# Patient Record
Sex: FEMALE | Race: BLACK OR AFRICAN AMERICAN | NOT HISPANIC OR LATINO | Employment: FULL TIME | URBAN - METROPOLITAN AREA
[De-identification: names, ages, dates, MRNs, and addresses within clinical notes are randomized per-mention and may not be internally consistent; named-entity substitution may affect disease eponyms.]

---

## 2023-03-31 LAB
CLUE CELLS: NORMAL
HEPATITIS B VIRUS SURFACE AG PRESENCE IN SERUM: NONREACTIVE
HEPATITIS C VIRUS AB PRESENCE IN SERUM: NONREACTIVE
HIV 1/ 2 AG/AB SCREEN: NONREACTIVE
NUGENT SCORE: 1
SYPHILIS TOTAL AB: NONREACTIVE
YEAST: NORMAL

## 2023-04-01 LAB
CHLAMYDIA TRACH., AMPLIFIED: NEGATIVE
N. GONORRHEA, AMPLIFIED: NEGATIVE
TRICHOMONAS VAGINALIS: NEGATIVE

## 2023-04-02 LAB — URINE CULTURE: ABNORMAL

## 2023-05-15 ENCOUNTER — APPOINTMENT (OUTPATIENT)
Dept: LAB | Facility: LAB | Age: 33
End: 2023-05-15

## 2023-05-16 LAB — URINE CULTURE: NORMAL

## 2023-09-21 ENCOUNTER — APPOINTMENT (OUTPATIENT)
Dept: PRIMARY CARE | Facility: CLINIC | Age: 33
End: 2023-09-21
Payer: MEDICAID

## 2023-09-21 DIAGNOSIS — A60.00 GENITAL HERPES SIMPLEX, UNSPECIFIED SITE: Primary | ICD-10-CM

## 2023-09-21 RX ORDER — VALACYCLOVIR HYDROCHLORIDE 500 MG/1
500 TABLET, FILM COATED ORAL 2 TIMES DAILY
Qty: 6 TABLET | Refills: 0 | Status: SHIPPED | OUTPATIENT
Start: 2023-09-21 | End: 2023-09-24

## 2024-01-19 ENCOUNTER — APPOINTMENT (OUTPATIENT)
Dept: PRIMARY CARE | Facility: CLINIC | Age: 34
End: 2024-01-19
Payer: MEDICAID

## 2024-01-19 ENCOUNTER — E-VISIT (OUTPATIENT)
Dept: PRIMARY CARE | Facility: CLINIC | Age: 34
End: 2024-01-19
Payer: MEDICAID

## 2024-01-19 DIAGNOSIS — A60.04 HERPES SIMPLEX VULVOVAGINITIS: Primary | ICD-10-CM

## 2024-01-19 RX ORDER — VALACYCLOVIR HYDROCHLORIDE 500 MG/1
500 TABLET, FILM COATED ORAL 2 TIMES DAILY
Qty: 6 TABLET | Refills: 0 | Status: SHIPPED | OUTPATIENT
Start: 2024-01-19 | End: 2024-01-22

## 2024-01-19 NOTE — TELEPHONE ENCOUNTER
[facility] Visit Note      E-Visit  Chief Complaint   Patient presents with    Rash     Entered automatically based on patient selection in  imoji.       With patient's permission, this is an E-visit to provide on demand virtual care through [facility] in the acute care setting.     HPI:  Melanie Brewster is a 33 y.o. female contacting  [facility] for concerns regarding a skin lesion    Current PCP: Aby Blanco    Patient's questionnaire answers reviewed: Rash and skin condition.  Symptoms started yesterday with a genital lesion that is white and unchanged over time.  A reoccurring skin condition described as genital herpes.  2 photos were attached but both are black and without any images.    Per chart review patient is known to have HSV and recorded as previously being on Valtrex.    Past Medical History:   Diagnosis Date    Anemia complicating pregnancy, third trimester 2016    Anemia during pregnancy in third trimester    Encounter for supervision of other normal pregnancy, third trimester 2016    Encounter for supervision of other normal pregnancy in third trimester    Encounter for supervision of other normal pregnancy, third trimester 2016    Prenatal care, subsequent pregnancy in third trimester    Encounter for supervision of other normal pregnancy, unspecified trimester 2016    Normal pregnancy in multigravida, antepartum    Personal history of other complications of pregnancy, childbirth and the puerperium 2015    History of ectopic pregnancy    Personal history of other diseases of the respiratory system     History of asthma    Supervision of pregnancy with insufficient  care, unspecified trimester 2016    Late prenatal care        Current Medications  Current Outpatient Medications   Medication Instructions    valACYclovir (VALTREX) 500 mg, oral, 2 times daily        Allergies  No Known Allergies     Past  Surgical History:   Procedure Laterality Date    OTHER SURGICAL HISTORY  07/21/2016    Wrist Surgery     No family history on file.     Tobacco Use: Not on file        ROS  All questionnaire responses reviewed.    VITAL SIGNS  Patient is unable to provide    PHYSICAL EXAM  E-visit, no exam component done.      Problem List Items Addressed This Visit             ICD-10-CM    Genital herpes simplex - Primary A60.00    Relevant Medications    valACYclovir (Valtrex) 500 mg tablet       Additional Visit Plans:  Unfortunately your photos did not come through but given your symptoms and that you are recorded as having a history of HSV previously treated with Valtrex I will treat you for what is understood to be a herpes outbreak/episode.    Please take Valtrex 500 mg every 12 hours for the next 3 days.  You are contagious while you have an active skin lesion, so please avoid direct skin contact with others/sexual activity until the skin lesion is gone.    Follow up  If continuing to experience symptoms despite the treatments I have prescribed to you today, please follow up with a primary care provider as needed.   To connect with a new PCP please visit [email or web address] or call [phone]    If experiencing any severe or worsening symptoms including but not limited to lethargy / chest pain / weakness / dizziness / difficulty breathing please call 101 or go to the emergency department for immediate care!    Thank you for allowing me to participate in your care needs today! I hope you feel better soon.         Filemon Martin DO   01/19/24   8:52 AM   [facility] Provider

## 2024-01-30 ENCOUNTER — OFFICE VISIT (OUTPATIENT)
Dept: PRIMARY CARE | Facility: HOSPITAL | Age: 34
End: 2024-01-30
Payer: MEDICAID

## 2024-01-30 VITALS
BODY MASS INDEX: 35.24 KG/M2 | DIASTOLIC BLOOD PRESSURE: 78 MMHG | HEIGHT: 64 IN | SYSTOLIC BLOOD PRESSURE: 108 MMHG | TEMPERATURE: 97.1 F | WEIGHT: 206.4 LBS

## 2024-01-30 DIAGNOSIS — J45.909 MILD ASTHMA, UNSPECIFIED WHETHER COMPLICATED, UNSPECIFIED WHETHER PERSISTENT (HHS-HCC): ICD-10-CM

## 2024-01-30 DIAGNOSIS — Z00.00 HEALTH CARE MAINTENANCE: Primary | ICD-10-CM

## 2024-01-30 LAB
ALBUMIN SERPL BCP-MCNC: 3.8 G/DL (ref 3.4–5)
ALP SERPL-CCNC: 43 U/L (ref 33–110)
ALT SERPL W P-5'-P-CCNC: 19 U/L (ref 7–45)
ANION GAP SERPL CALC-SCNC: 10 MMOL/L (ref 10–20)
AST SERPL W P-5'-P-CCNC: 14 U/L (ref 9–39)
BASOPHILS # BLD AUTO: 0.02 X10*3/UL (ref 0–0.1)
BASOPHILS NFR BLD AUTO: 0.3 %
BILIRUB SERPL-MCNC: 0.3 MG/DL (ref 0–1.2)
BUN SERPL-MCNC: 12 MG/DL (ref 6–23)
CALCIUM SERPL-MCNC: 9 MG/DL (ref 8.6–10.6)
CHLORIDE SERPL-SCNC: 107 MMOL/L (ref 98–107)
CHOLEST SERPL-MCNC: 132 MG/DL (ref 0–199)
CHOLESTEROL/HDL RATIO: 3.4
CO2 SERPL-SCNC: 26 MMOL/L (ref 21–32)
CREAT SERPL-MCNC: 0.68 MG/DL (ref 0.5–1.05)
EGFRCR SERPLBLD CKD-EPI 2021: >90 ML/MIN/1.73M*2
EOSINOPHIL # BLD AUTO: 0.15 X10*3/UL (ref 0–0.7)
EOSINOPHIL NFR BLD AUTO: 2.2 %
ERYTHROCYTE [DISTWIDTH] IN BLOOD BY AUTOMATED COUNT: 13.5 % (ref 11.5–14.5)
EST. AVERAGE GLUCOSE BLD GHB EST-MCNC: 100 MG/DL
GLUCOSE SERPL-MCNC: 125 MG/DL (ref 74–99)
HBA1C MFR BLD: 5.1 %
HCT VFR BLD AUTO: 38.4 % (ref 36–46)
HDLC SERPL-MCNC: 38.3 MG/DL
HGB BLD-MCNC: 12.7 G/DL (ref 12–16)
IMM GRANULOCYTES # BLD AUTO: 0.06 X10*3/UL (ref 0–0.7)
IMM GRANULOCYTES NFR BLD AUTO: 0.9 % (ref 0–0.9)
LDLC SERPL CALC-MCNC: 79 MG/DL
LYMPHOCYTES # BLD AUTO: 1.78 X10*3/UL (ref 1.2–4.8)
LYMPHOCYTES NFR BLD AUTO: 26.4 %
MCH RBC QN AUTO: 31 PG (ref 26–34)
MCHC RBC AUTO-ENTMCNC: 33.1 G/DL (ref 32–36)
MCV RBC AUTO: 94 FL (ref 80–100)
MONOCYTES # BLD AUTO: 0.51 X10*3/UL (ref 0.1–1)
MONOCYTES NFR BLD AUTO: 7.6 %
NEUTROPHILS # BLD AUTO: 4.22 X10*3/UL (ref 1.2–7.7)
NEUTROPHILS NFR BLD AUTO: 62.6 %
NON HDL CHOLESTEROL: 94 MG/DL (ref 0–149)
NRBC BLD-RTO: 0 /100 WBCS (ref 0–0)
PLATELET # BLD AUTO: 282 X10*3/UL (ref 150–450)
POTASSIUM SERPL-SCNC: 4 MMOL/L (ref 3.5–5.3)
PROT SERPL-MCNC: 6.5 G/DL (ref 6.4–8.2)
RBC # BLD AUTO: 4.1 X10*6/UL (ref 4–5.2)
SODIUM SERPL-SCNC: 139 MMOL/L (ref 136–145)
TRIGL SERPL-MCNC: 74 MG/DL (ref 0–149)
TSH SERPL-ACNC: 3.29 MIU/L (ref 0.44–3.98)
VLDL: 15 MG/DL (ref 0–40)
WBC # BLD AUTO: 6.7 X10*3/UL (ref 4.4–11.3)

## 2024-01-30 PROCEDURE — 99213 OFFICE O/P EST LOW 20 MIN: CPT

## 2024-01-30 PROCEDURE — 36415 COLL VENOUS BLD VENIPUNCTURE: CPT

## 2024-01-30 PROCEDURE — 80061 LIPID PANEL: CPT

## 2024-01-30 PROCEDURE — 1036F TOBACCO NON-USER: CPT

## 2024-01-30 PROCEDURE — 83036 HEMOGLOBIN GLYCOSYLATED A1C: CPT

## 2024-01-30 PROCEDURE — 85025 COMPLETE CBC W/AUTO DIFF WBC: CPT

## 2024-01-30 PROCEDURE — 84443 ASSAY THYROID STIM HORMONE: CPT

## 2024-01-30 PROCEDURE — 99213 OFFICE O/P EST LOW 20 MIN: CPT | Mod: GC,GE,27

## 2024-01-30 PROCEDURE — 80053 COMPREHEN METABOLIC PANEL: CPT

## 2024-01-30 RX ORDER — ALBUTEROL SULFATE 90 UG/1
2 AEROSOL, METERED RESPIRATORY (INHALATION) EVERY 4 HOURS PRN
Qty: 8 G | Refills: 11 | Status: SHIPPED | OUTPATIENT
Start: 2024-01-30 | End: 2025-01-29

## 2024-01-30 SDOH — ECONOMIC STABILITY: FOOD INSECURITY: WITHIN THE PAST 12 MONTHS, YOU WORRIED THAT YOUR FOOD WOULD RUN OUT BEFORE YOU GOT MONEY TO BUY MORE.: NEVER TRUE

## 2024-01-30 SDOH — ECONOMIC STABILITY: FOOD INSECURITY: WITHIN THE PAST 12 MONTHS, THE FOOD YOU BOUGHT JUST DIDN'T LAST AND YOU DIDN'T HAVE MONEY TO GET MORE.: NEVER TRUE

## 2024-01-30 ASSESSMENT — LIFESTYLE VARIABLES
HOW MANY STANDARD DRINKS CONTAINING ALCOHOL DO YOU HAVE ON A TYPICAL DAY: PATIENT DOES NOT DRINK
HOW OFTEN DO YOU HAVE SIX OR MORE DRINKS ON ONE OCCASION: NEVER
SKIP TO QUESTIONS 9-10: 1
HOW OFTEN DO YOU HAVE A DRINK CONTAINING ALCOHOL: NEVER
AUDIT-C TOTAL SCORE: 0

## 2024-01-30 ASSESSMENT — PATIENT HEALTH QUESTIONNAIRE - PHQ9
1. LITTLE INTEREST OR PLEASURE IN DOING THINGS: NOT AT ALL
2. FEELING DOWN, DEPRESSED OR HOPELESS: NOT AT ALL
SUM OF ALL RESPONSES TO PHQ9 QUESTIONS 1 & 2: 0

## 2024-01-30 ASSESSMENT — ENCOUNTER SYMPTOMS
OCCASIONAL FEELINGS OF UNSTEADINESS: 0
LOSS OF SENSATION IN FEET: 0
DEPRESSION: 0

## 2024-01-30 ASSESSMENT — PAIN SCALES - GENERAL: PAINLEVEL: 0-NO PAIN

## 2024-01-30 NOTE — PROGRESS NOTES
I saw and evaluated the patient. I personally obtained the key and critical portions of the history and physical exam or was physically present for key and critical portions performed by the resident/fellow. I reviewed the resident/fellow's documentation and discussed the patient with the resident/fellow. I agree with the resident/fellow's medical decision making as documented in the note.    Case d/w Dr. Hendrix; agree with his A/P.  Importance of weight loss reviewed with pt. Continues Albuterol PRN. Pt encouraged to avoid beverages with caffeine. Would check blood work including TSH. Would attempt to obtain and review pt's last pap smear report.  Chandrika Matson MD

## 2024-01-30 NOTE — PROGRESS NOTES
"Subjective   Patient ID: Melanie Brewster is a 33 y.o. female who presents for Establish Care ([facility]) and Med Refill.    HPI   Pt is a 33 year old F with a pmhx of asthma and urinary incontinence who presented today to establish follow-up. She says she follows with an obs/gyn physician and urologist but last saw them about 1 year ago especially as she is no longer having urinary incontinence again. She is currently on her period.     As per her asthma, she uses her inhaler like two a week, last hospitalization for asthma was as a kid and last time she visited the ED for asthma was in 2016 and she was not admitted. She complains of occasional palpitations which is possibly correlating to her albuterol use.     Denies fever, chest pain, sob except on exertion, abd pain, skin changes, bilateral leg swelling. She denies etoh, tobacco or illicit drug use.     Review of Systems  12 point ROS is NC    Objective   /78 (BP Location: Left arm, Patient Position: Sitting, BP Cuff Size: Adult)   Temp 36.2 °C (97.1 °F) (Temporal)   Ht 1.626 m (5' 4\")   Wt 93.6 kg (206 lb 6.4 oz)   BMI 35.43 kg/m²     Physical Exam  GEN: no obvious distress  HEENT: no jvd  Chest: CTAB  CVS: s1s2 wnl  ABD: non-tender  MSK: no leg swelling  SKIN: no changes    Assessment/Plan   Pt is a 33 year old F with a pmhx of asthma and urinary incontinence who presented today to establish follow-up.     #Asthma: Mild intermittent  -counseled on trigger factors  -Albuterol refilled    #Obesity  ::BMI 35.43  -counseled on diet and exercise  -given referral to nutrition services  -Inquired about GLP-1 agonists: keep in view  -Hba1c ordered    #Urinary incontinence  #Uterine prolapse  -resolved    #Palpitations  -TSH ordered    #Health Maintenance  -received flu shot  -cbc/cmp/lipids/tsh/hba1c ordered  -not yet due for mammogram  -last pap smear/hpv testing was in 2021?? No records: encouraged to call gynecologist to inquire about when next " due.  -counseled on diet/exercise  -one-time screening for hep c  -can technically qualify for pneumococcal vaccine as asthma can qualify as immune-compromised?  -not sure if patient has received covid vaccine: will ask on next visit.     Ramakrishna Be PGY-3

## 2024-01-30 NOTE — PATIENT INSTRUCTIONS
Dear Ms Melanie,    Thank you for your visit today. We talked a couple of medical issues ranging from your med refill to weight loss referral to health maintenance screening which you are due for.  You will be given a referral to the weight loss clinic. You will also have labs done today to establish your baseline. Please, call your Obstetrics and Gynecology healthcare professional to ascertain your last pap smear and see when next you are due. We will see you in six months.    Vincent,  Ramakrishna Be MD

## 2024-06-01 ENCOUNTER — TELEMEDICINE (OUTPATIENT)
Dept: PRIMARY CARE | Facility: CLINIC | Age: 34
End: 2024-06-01
Payer: MEDICAID

## 2024-06-01 ENCOUNTER — APPOINTMENT (OUTPATIENT)
Dept: PRIMARY CARE | Facility: CLINIC | Age: 34
End: 2024-06-01
Payer: MEDICAID

## 2024-06-01 DIAGNOSIS — B00.9 HSV-2 (HERPES SIMPLEX VIRUS 2) INFECTION: Primary | ICD-10-CM

## 2024-06-01 PROCEDURE — 1036F TOBACCO NON-USER: CPT | Performed by: FAMILY MEDICINE

## 2024-06-01 PROCEDURE — 99212 OFFICE O/P EST SF 10 MIN: CPT | Performed by: FAMILY MEDICINE

## 2024-06-01 RX ORDER — VALACYCLOVIR HYDROCHLORIDE 500 MG/1
500 TABLET, FILM COATED ORAL 2 TIMES DAILY
Qty: 6 TABLET | Refills: 1 | Status: SHIPPED | OUTPATIENT
Start: 2024-06-01 | End: 2024-06-07

## 2024-06-01 RX ORDER — ASPIRIN 81 MG/1
81 TABLET ORAL
COMMUNITY
Start: 2024-05-23 | End: 2024-06-01 | Stop reason: ALTCHOICE

## 2024-06-01 RX ORDER — MEDROXYPROGESTERONE ACETATE 150 MG/ML
INJECTION, SUSPENSION INTRAMUSCULAR
COMMUNITY
Start: 2022-08-25 | End: 2024-06-01 | Stop reason: ALTCHOICE

## 2024-06-01 NOTE — PATIENT INSTRUCTIONS
HSV-2 outbreak  Valacyclovir as written-- 1 refill  Follow up as needed    Please send me a InHiro message if you have any questions or concerns.  FOR NON URGENT questions only.  Allow up to 72 hours for response.    If you have prescription issues or other questions you can email   Janeen Godwin  Digital Health Coordinator, at   [email or web address]     Rest and drink plenty of fluids    Tylenol and or motrin as needed for pain and fever (unless you have been told not to take these because of your personal medical history)    Discussed options and precautions (complaint specific and may include)  Viral versus bacterial infection; use of medications; possible side effects; appropriate over-the-counter medications; possible complications and /or when to follow-up.    Follow-up in 1 to 2 days if not improving.  Follow-up immediately if symptoms worsen.    All red flags requiring in person care were discussed.  All patient's questions were answered.    To connect with a new PCP please visit [email or web address] or call [phone]     If experiencing any severe or worsening symptoms including but not limited to lethargy / chest pain / weakness / dizziness / difficulty breathing please call 911 or go to the emergency department for immediate care!    Limitations to telemedicine include inability to do a complete and accurate physical exam.  Any concerns regarding this were conveyed with the patient and in person follow-up recommended if patient nature of illness does not progress as anticipated during this visit.    CDC updated Respiratory Infection guidelines:   When you have a respiratory virus, stay home and away from others (including people  you live with who are not sick) Symptoms can include fever, chills, fatigue, cough,  runny nose, and headache (and others).    You can go back to your normal activities when,   for at least 24 hours,   BOTH are true:    1) Your  symptoms are getting better overall  2) You have not had a fever (and are not using fever-reducing medication).    When you go back to your normal activities, take added precaution over the next 5 days, such as taking additional steps for  air, hygiene, masks, physical distancing, and/or testing when you will be around other people indoors.    Keep in mind that you may still be able to spread the virus that made you sick, even if you are feeling better. You are likely to be less contagious at this time, depending on factors like how long you were sick or how sick you were.    If you develop a fever or you start to feel worse after you have gone back to normal activities, stay home and away from others again until, for at least 24 hours, both are true: your symptoms are improving overall, and you have not had a fever (and are not using fever-reducing medication). Then take added precaution for the next 5 days.    If you never had symptoms but tested positive for a respiratory virus?, you may be contagious. For the next 5 days: take added precaution, such as taking additional steps for  air, hygiene, masks, physical distancing, and/or testing when you will be around other people indoors. This is especially important to protect people with factors that increase their risk of severe illness from respiratory viruses.  Avoid immunocompromised, elderly, pregnant women, infants etc    Have a low threshold for in person evaluation if your symptoms worsen.

## 2024-06-01 NOTE — PROGRESS NOTES
I performed this visit using realtime telehealth tools, including an audio/video OR telephone connection between the patient listed who was located in the STATE [facility] and myself, Aida Sparks (Board certified in the [facility]).  At the start of the visit, I introduced myself as Dr. Rhodes and verified the patients name, , and current physical location.    If they were currently outside of the state of [place], the visit was ended and the patient was referred to alternative means for evaluation and treatment.   The patient was made aware of the limitations of the telehealth visit.  They will not be physically examined and all issues may not be appropriate for a telehealth visit.  If necessary, an in person referral will be made.      DISCLAIMER:   In preparing for this visit and writing this note, I reviewed previous electronic medical records (labs, imaging and medical charts) of the patient available in the physician portal. Significant findings which helped in decision making are recorded in this encounter charting.    All allergies were reviewed with the patient and all medications reconciled with the patient.    Needs rx for valacyclovir--last needed it- > 6 mos ago  Diagnosed many years ago   Did not have an outbreak for at least 10 years and now gets them about 2x a year  This one just started--   1 lesion--  No fever chills aches  No N,V,D    Alert in NAD  Eyes clear  No resp distress of coughing  Affect wnl    HSV-2 outbreak  Valacyclovir as written-- 1 refill  Discussed contagiousness   Follow up as needed

## 2025-06-10 ENCOUNTER — HOSPITAL ENCOUNTER (EMERGENCY)
Facility: HOSPITAL | Age: 35
Discharge: HOME | End: 2025-06-11
Attending: EMERGENCY MEDICINE
Payer: COMMERCIAL

## 2025-06-10 VITALS
WEIGHT: 210 LBS | BODY MASS INDEX: 35.85 KG/M2 | HEART RATE: 89 BPM | HEIGHT: 64 IN | OXYGEN SATURATION: 97 % | DIASTOLIC BLOOD PRESSURE: 81 MMHG | RESPIRATION RATE: 18 BRPM | SYSTOLIC BLOOD PRESSURE: 124 MMHG | TEMPERATURE: 97.9 F

## 2025-06-10 DIAGNOSIS — H91.91 HEARING LOSS OF RIGHT EAR, UNSPECIFIED HEARING LOSS TYPE: ICD-10-CM

## 2025-06-10 DIAGNOSIS — H66.91 RIGHT OTITIS MEDIA, UNSPECIFIED OTITIS MEDIA TYPE: Primary | ICD-10-CM

## 2025-06-10 DIAGNOSIS — G51.0 BELL'S PALSY: ICD-10-CM

## 2025-06-10 PROCEDURE — 99283 EMERGENCY DEPT VISIT LOW MDM: CPT | Performed by: EMERGENCY MEDICINE

## 2025-06-10 PROCEDURE — 99282 EMERGENCY DEPT VISIT SF MDM: CPT | Performed by: EMERGENCY MEDICINE

## 2025-06-10 ASSESSMENT — PAIN SCALES - GENERAL: PAINLEVEL_OUTOF10: 0 - NO PAIN

## 2025-06-10 ASSESSMENT — PAIN - FUNCTIONAL ASSESSMENT: PAIN_FUNCTIONAL_ASSESSMENT: 0-10

## 2025-06-10 NOTE — Clinical Note
Melanie Brewster was seen and treated in our emergency department on 6/10/2025.  She may return to work on 06/13/2025.       If you have any questions or concerns, please don't hesitate to call.      Michael Brosusard MD

## 2025-06-11 PROCEDURE — 2500000001 HC RX 250 WO HCPCS SELF ADMINISTERED DRUGS (ALT 637 FOR MEDICARE OP)

## 2025-06-11 RX ORDER — ACETAMINOPHEN 325 MG/1
975 TABLET ORAL ONCE
Status: COMPLETED | OUTPATIENT
Start: 2025-06-11 | End: 2025-06-11

## 2025-06-11 RX ORDER — FLUTICASONE PROPIONATE 50 MCG
1 SPRAY, SUSPENSION (ML) NASAL DAILY
Qty: 16 G | Refills: 0 | Status: SHIPPED | OUTPATIENT
Start: 2025-06-11 | End: 2025-06-21

## 2025-06-11 RX ORDER — ACETAMINOPHEN 325 MG/1
650 TABLET ORAL EVERY 6 HOURS PRN
Qty: 30 TABLET | Refills: 0 | Status: SHIPPED | OUTPATIENT
Start: 2025-06-11

## 2025-06-11 RX ADMIN — ACETAMINOPHEN 975 MG: 325 TABLET ORAL at 00:50

## 2025-06-11 NOTE — DISCHARGE INSTRUCTIONS
You have been evaluated in the Emergency Department today for earache and ear numbness. Your evaluation, including history and physical exam, suggests that your symptoms are due to a resolving ear infection complicated by Bell's phenomenon and Bell's palsy.  We do not feel that your presentation today is consistent with a stroke or other central neurologic disorder.  We have given you artificial tears and a follow-up appoint with ophthalmology.    Please follow up with your primary care physician within two days. If you do not have a primary care doctor you may call [phone] to make an appointment.    Return to the Emergency Department if you experience worsening pain, swelling, difficulty hearing, changes in your neurologic exam, eye pain. Return to the Emergency Department if you develop any new or worsening symptoms.   Thank you for choosing us for your care.

## 2025-06-11 NOTE — ED PROVIDER NOTES
History of Present Illness     History provided by: Patient  Limitations to History: None  External Records Reviewed: Outpatient provider notes documenting past medical history    HPI:  Melanie Brewster is a 35 y.o. female Melanie Brewster is a 35 y.o. female with history of asthma and HSV-2 presents to the emergency department for right ear pain and pressure which began approximately 1 week ago.  She did go to the [facility] yesterday and they felt that she was presenting with otitis media.  She presents to the emergency department for right sided facial numbness patient states no changes in her vision.  She denies fevers or chills.  She denies numbness other than in her face.  She states no weakness, changes in her balance, falls or trauma.  Patient states that her ear pain has resolved and now she has numbness over the right side of her face.  She has not noticed any changes in her sense of taste, smell, changes in her speech.    Physical Exam   Triage vitals:  T 36.6 °C (97.9 °F)  HR 89  /81  RR 18  O2 97 % None (Room air)    General: Awake, alert, in no acute distress  Eyes: Gaze conjugate.  No scleral icterus or injection, Bell's phenomenon noted on the right, there is a subtle right middle ear effusion without perforation or external ear findings, no pain on manipulation of the pinna, no pain over the mastoid, there is no erythema of the face, there are no rashes, there is no Jacobsen sign  HENT: Normo-cephalic, atraumatic. No stridor  CV: Regular rate, regular rhythm. Radial pulses 2+ bilaterally  Resp: Breathing non-labored, speaking in full sentences.  Clear to auscultation bilaterally  GI: Soft, non-distended, non-tender. No rebound or guarding.  : Deferred  MSK/Extremities: No gross bony deformities. Moving all extremities  Skin: Warm. Appropriate color  Neuro: Awake alert and oriented x 4, pupils equal round reactive to light and accommodation, EOMs are intact,  Bell's phenomenon noted, patient has diminished sensation to the V1, 2, 3 dermatomes on the right, tongue protrusion is midline, patient has subtle asymmetry of forehead wrinkling on the right, symmetric head turn, no pronator drift, 5 out of 5 strength in the bilateral upper extremities at the major muscle groups, 5 out of 5 strength at the major muscle groups of the bilateral lower extremities, sensation intact to light touch over all 4 extremities, no ataxia, no dysmetria, no difficulty in ambulation   Psych: Appropriate mood and affect    Medical Decision Making & ED Course   Medical Decision Makin y.o. female who is hemodynamically stable presents to the emergency department for the evaluation in the setting of a now resolving earache and facial numbness.  Patient's presentation consistent with a Bell's palsy.  Less likely trigeminal neuralgia.  Not felt to be an active HSV infection based on physical exam and historical factors.  Patient has a subtle middle ear effusion but is appropriately taking antibiotics.  Encouraged her to continue her antibiotics.  We did give referral to audiology and ophthalmology.  Prescribed artificial tears.  No concern for central process based on physical exam.  Discussed axial imaging but deferred in shared decision making.  Did give nasal fluticasone for steroid treatment of Bell's palsy.  Patient was given strict return precautions and discharged.  ----         Social Determinants of Health which Significantly Impact Care: None identified       Chronic conditions affecting the patient's care: See HPI    The patient was discussed with the following consultants/services: Please see ED course for consult transcript        ED Course:  Diagnoses as of 25 0039   Right otitis media, unspecified otitis media type   Bell's palsy   Hearing loss of right ear, unspecified hearing loss type     Disposition   As a result of the work-up, the patient was discharged home.  she was  informed of her diagnosis and instructed to come back with any concerns or worsening of condition.  she and was agreeable to the plan as discussed above.  she was given the opportunity to ask questions.  All of the patient's questions were answered.    Procedures   Procedures    Patient was seen and discussed with the attending of record.    Michael Broussard MD  Emergency Medicine     Michael Broussard MD  Resident  06/11/25 0043

## 2025-06-11 NOTE — ED TRIAGE NOTES
Patient presents to the ED for right side facial numbness. Patient states she went to the urgent care two days ago and four days ago for an ear infection. Patient states the pain has stopped but now has numbness by her ear.

## 2025-06-24 ENCOUNTER — TELEPHONE (OUTPATIENT)
Dept: AUDIOLOGY | Facility: HOSPITAL | Age: 35
End: 2025-06-24
Payer: COMMERCIAL

## 2025-08-05 ENCOUNTER — APPOINTMENT (OUTPATIENT)
Dept: OPHTHALMOLOGY | Facility: CLINIC | Age: 35
End: 2025-08-05
Payer: COMMERCIAL